# Patient Record
(demographics unavailable — no encounter records)

---

## 2024-12-16 NOTE — PHYSICAL EXAM
[Chaperone Present] : A chaperone was present in the examining room during all aspects of the physical examination [Oriented x3] : oriented x3 [Examination Of The Breasts] : a normal appearance [No Masses] : no breast masses were palpable [Labia Majora] : normal [Labia Minora] : normal [Uterine Adnexae] : normal [FreeTextEntry2] :  Appropriately responsive, alert, and no acute distress. [FreeTextEntry3] :  Thyroid is non-enlarged, nontender. No palpable nodules or goiter. No lymphadenopathy. [FreeTextEntry7] :  Soft. Nondistended. Nontender. No rebound or guarding. There are no palpable masses. [Normal] : normal [FreeTextEntry1] :  External genitalia are within normal limits with no lesions visualized. [FreeTextEntry4] :  No vaginal discharge, blood or any lesions noted within the vaginal vault. [FreeTextEntry5] :  A speculum was inserted without any difficulty. The cervix is posterior. IUD strings not visualized. The cervix was normal in appearance. Pap smear obtained. No cervical motion tenderness. [FreeTextEntry6] : Bimanual examination was notable for a normal, nontender uterus. There were no palpable adnexal masses or adnexal tenderness.

## 2024-12-16 NOTE — PLAN
[FreeTextEntry1] : Wellness exam. Normal physical examination. Recommendations: ophthalmological, dental, and dermatological examination.   Status post insertion of Kyleena IUD February 2020. Discussed contraception options.  Discussed proper nutrition and physical exercise. Reviewed age-appropriate vaccinations.

## 2024-12-16 NOTE — HISTORY OF PRESENT ILLNESS
[Patient reported PAP Smear was normal] : Patient reported PAP Smear was normal [N] : Patient reports normal menses [Y] : Positive pregnancy history [Normal Amount/Duration] :  normal amount and duration [Regular Cycle Intervals] : periods have been regular [Frequency: Q ___ days] : menstrual periods occur approximately every [unfilled] days [Menarche Age: ____] : age at menarche was [unfilled] [No] : Patient does not have concerns regarding sex [Currently Active] : currently active [Men] : men [TextBox_4] : 35 -year old  1 para 1 LMP 2024 presents for an annual examination. Review of systems are negative. She also wanted to discuss possible IUD removal next February. She states that her Kyleena IUD was inserted in 2020. [PapSmeardate] : 01/02/2023 [LMPDate] : 12/05/2024 [MensesFreq] : 28 [MensesLength] : 5 [PGxTotal] : 1 [[facility] [[facility]] : 1 [FreeTextEntry1] : 12/05/2024 [FreeTextEntry3] : IUD